# Patient Record
Sex: FEMALE | Race: WHITE | URBAN - METROPOLITAN AREA
[De-identification: names, ages, dates, MRNs, and addresses within clinical notes are randomized per-mention and may not be internally consistent; named-entity substitution may affect disease eponyms.]

---

## 2021-07-13 ENCOUNTER — HOSPITAL ENCOUNTER (OUTPATIENT)
Dept: GENERAL RADIOLOGY | Age: 66
Discharge: HOME | End: 2021-07-15
Payer: MEDICARE

## 2021-07-13 ENCOUNTER — OFFICE VISIT (OUTPATIENT)
Dept: ORTHOPEDIC SURGERY | Age: 66
End: 2021-07-13
Payer: MEDICARE

## 2021-07-13 VITALS — TEMPERATURE: 97.7 F

## 2021-07-13 DIAGNOSIS — M79.671 RIGHT FOOT PAIN: ICD-10-CM

## 2021-07-13 DIAGNOSIS — S92.504A CLOSED NONDISPLACED FRACTURE OF PHALANX OF LESSER TOE OF RIGHT FOOT, UNSPECIFIED PHALANX, INITIAL ENCOUNTER: Primary | ICD-10-CM

## 2021-07-13 DIAGNOSIS — M79.671 RIGHT FOOT PAIN: Primary | ICD-10-CM

## 2021-07-13 PROCEDURE — 99202 OFFICE O/P NEW SF 15 MIN: CPT | Performed by: PHYSICIAN ASSISTANT

## 2021-07-13 PROCEDURE — 73630 X-RAY EXAM OF FOOT: CPT

## 2021-07-13 RX ORDER — CLOBETASOL PROPIONATE 0.46 MG/ML
SOLUTION TOPICAL
COMMUNITY
Start: 2021-06-14

## 2021-07-13 RX ORDER — SILVER SULFADIAZINE 1 %
CREAM (GRAM) TOPICAL
COMMUNITY
Start: 2021-06-14

## 2021-07-13 RX ORDER — ERGOCALCIFEROL 1.25 MG/1
CAPSULE ORAL
COMMUNITY
Start: 2021-05-13

## 2021-07-13 NOTE — PATIENT INSTRUCTIONS
As tolerated in stiff soled postop shoe  Ice, elevation as needed for pain and swelling control  Tylenol and ibuprofen as needed  Okay to wean out of postop shoe once pain controlled, typically over the next 3-4 weeks    Neck scheduled appointment with podiatry for follow-up care.

## 2021-07-14 NOTE — PROGRESS NOTES
Subjective:      Liliane Pinon is a 77 y.o. female who presents with right foot pain. Onset of the symptoms was yesterday. Precipitating event: struck her toe on a door. Current symptoms include: ability to bear weight, but with some pain, bruising, stiffness and swelling. Aggravating factors: any weight bearing and walking. Symptoms have essentially not changed. Patient has had prior foot problems but not to the toes or in this manor. Does have history of bunion and plantars wart which she follows with Podiatry. . Evaluation to date: none. Treatment to date: avoidance of offending activity, ice and OTC analgesics which are somewhat effective. Patient's medications, allergies, past medical, surgical, social and family histories were reviewed and updated as appropriate. Review of Systems  Pertinent items are noted in HPI. Objective:      Temp 97.7 °F (36.5 °C) (Oral)   Right foot:  soft tissue swelling noted over the 5th digit   Skin circumferentially intact  Hallux valgus deformity noted  Mild rotary deformity noted to the right little digit when compared to the contralateral side  Mild ecchymosis noted to the fifth metatarsal distally, MTP joint, fifth digit  Tender to palpation at the fifth MTP without palpable gross motion of a fracture  Unable to fully range toes secondary to pain  Full active range of motion of tibiotalar joint. Patient is neurovascularly intact distally  There is no recreated pain or instability assessed with anterior drawer talar tilt on the right ankle   Left foot:  normal exam, no swelling, tenderness, instability; ligaments intact, full range of motion of all ankle/foot joints     Imaging:  X-ray of the right foot: shows DJD changes, likely chronic and fracture of Proximal phalanx of the fifth toe, no significant displacement or malrotation. Notable degenerative changes throughout. Osteopenic bone noted.       Assessment:      Fracture of fifth digit proximal phalanx,